# Patient Record
(demographics unavailable — no encounter records)

---

## 2024-11-01 NOTE — HISTORY OF PRESENT ILLNESS
[FreeTextEntry1] : 32 y/o male with no PMHx, here for 2mm right mid ureteral stone. He was seen at [facility] ER earlier this week; afebrile with WBC 9.44, Cr 0.94, UA with small LE, negative nitrite, 755 RBC, 4 WBC. Urine culture - <10k normal  christian. He was discharged home on MET and pain medications. He reports significant improvement in right flank pain and has required minimal pain meds. Denies fevers, chills, n/v. He had hematuria on day of ED visit which has since resolved. Reports urinary frequency but denies other urinary symptoms. No previous history of nephrolithiasis.

## 2024-11-01 NOTE — ASSESSMENT
[Ureteral Stone (592.1\N20.1)] : position [FreeTextEntry1] : 30 y/o male with 2mm right mid ureteral stone with minimal hydronephrosis. We discussed given size/location, he has ~80% chance of passing the stone over 6 week period on MET, and that if he does not pass the stone, he will require ureteroscopy. We also discussed microhematuria is likely due to his ureteral stone but that we should repeat UA after stone passage/treatment.  Plan: -Continue Flomax 0.4mg daily, refill sent to pharmacy -Pain control with Tylenol, ibuprofen and percocet prn for severe pain -Continue PO hydration 2-3L daily -Repeat CT stone hunt in 4 weeks -F/u after CT -Will repeat UA after stone passage/treatment given microhematuria - Strainer provider and patient to notify us if passes

## 2024-12-06 NOTE — ASSESSMENT
[FreeTextEntry1] : Darinel returns for follow up care of a R Ureteral stone which was discovered in October in the ED. He had minimal hydro, a negative culture, UA with < 10 WBC and numerous RBCs. He has no gross hematuria and describes now resolution of his pain. No flank pain, no urinary symptoms, he is emptying well and has had no fevers, chills or other systematic symptoms. he had a CT scan 11/30 which revealed no nephrolithiasis. However, he did have a gallbladder stone and now describes a biliary colic pattern of pain. He states that 30-90 mins after eating greasy food, (for example [employer] yesterday), he had burning in the RUQ of his abdomen and cramping, which improves with drinking water. Nof elva/chills/signs of infection.  Discussed surveillance, management and prevention of kidney stones with the patient. He was counselled about the risks and benefits of 24 hour litholink, surveillance US, dietary changes, oral hydration. Prefers to follow up with US in 6 months and will enact dietary changes to counteract stone formation.   - f/u 6 months with [facility] - dietary changes (advised lemon water, citrus fruits, low salt diet, and oral hydration until urine clear) - general surgery referral (referred to Dr. Jiménez, to be seen next week for gallstone - spoke with Dr. Jiménez, his team will reach out) - UA Microscopic today to ensure resolution microhematuria

## 2024-12-06 NOTE — HISTORY OF PRESENT ILLNESS
[Abdominal Pain] : abdominal pain [None] : None [FreeTextEntry1] : 30 y/o male with no PMHx, here for 2mm right mid ureteral stone. He was seen at [facility] ER earlier this week; afebrile with WBC 9.44, Cr 0.94, UA with small LE, negative nitrite, 755 RBC, 4 WBC. Urine culture - <10k normal  christian. He was discharged home on MET and pain medications. He reports significant improvement in right flank pain and has required minimal pain meds. Denies fevers, chills, n/v. He had hematuria on day of ED visit which has since resolved. Reports urinary frequency but denies other urinary symptoms. No previous history of nephrolithiasis.  12/6/2024:   Feeling well, he has no further gross hematuria, and describes now resolution of his pain. No flank pain, no urinary symptoms, he is emptying well and has had no fevers, chills or systemic symptoms. CT scan 11/30 reviewed today which revealed no nephrolithiasis. However, he did have a gallbladder stone on the scan, and now describes a biliary colic pattern of pain. He states that 30-90 mins after eating greasy food, (for example [employer] yesterday), he had burning in the RUQ of his abdomen and cramping, which improves with drinking water. Discussed surveillance, management and prevention of kidney stones with the patient. He was counselled about the risks and benefits of 24 hour litholink, surveillance US, dietary changes, oral hydration. Prefers to follow up with US in 6 months and will enact dietary changes to counteract stone formation.

## 2024-12-16 NOTE — ASSESSMENT
[FreeTextEntry1] : Patient is a 31 year old gentleman with history of nephrolithiasis who presents now for evaluation of symptomatic cholelithiasis. He reports having recurrent episodes of postprandial right upper quadrant abdominal pain, exacerbated by fatty meals. He reports having 2-3 previous episodes. He underwent previous imaging including CT scan that demonstrated cholelithiasis. At time of evaluation, afebrile, hemodynamically stable, abdomen soft, mild RUQ tenderness, nondistended, no rebound or guarding. Denies fevers, chills, chest pain, dyspnea, hematochezia, melena.  In summary, Mr. MADHAVI LOUISE has symptomatic cholelithiasis / biliary colic for which I recommend robotic assisted cholecystectomy at the patient's earliest convenience. I discussed the risks and benefits including, but not limited to, injury to intra-abdominal organs, bleeding, infection, bile duct injury, pneumonia, venous thromboembolism, bile leak. We discussed these in detail. I also discussed the normal [place]- and post-operative course. I answered all questions about this surgery and possible complications to the best of my ability and the patient verbalized understanding. We discussed the possible need for drains as well. Should unrelenting symptoms or signs of incarceration develop prior to surgery the patient knows to call or go to the emergency room.  Plan for robotic assisted cholecystectomy Pre-op labs and imaging  I, Dr. Saumya Jiménez, spent 50 minutes with the patient >50% counseling/coordination of care including, reviewing the patient's history, performing an examination, reviewing relevant labs and radiographic imaging, reviewing PCP and consultant notes, discussion of medical and surgical management of the diagnosis as well as associated risks and benefits, and completing documentation.

## 2024-12-16 NOTE — HISTORY OF PRESENT ILLNESS
[de-identified] : Patient is a 31 year old gentleman with history of nephrolithiasis who presents now for evaluation of symptomatic cholelithiasis. He reports having recurrent episodes of postprandial right upper quadrant abdominal pain, exacerbated by fatty meals. He reports having 2-3 previous episodes. He underwent previous imaging including CT scan that demonstrated cholelithiasis. At time of evaluation, afebrile, hemodynamically stable, abdomen soft, mild RUQ tenderness, nondistended, no rebound or guarding. Denies fevers, chills, chest pain, dyspnea, hematochezia, melena.